# Patient Record
Sex: MALE | HISPANIC OR LATINO
[De-identification: names, ages, dates, MRNs, and addresses within clinical notes are randomized per-mention and may not be internally consistent; named-entity substitution may affect disease eponyms.]

---

## 2020-07-01 PROBLEM — Z00.00 ENCOUNTER FOR PREVENTIVE HEALTH EXAMINATION: Status: ACTIVE | Noted: 2020-07-01

## 2020-07-02 ENCOUNTER — NON-APPOINTMENT (OUTPATIENT)
Age: 65
End: 2020-07-02

## 2020-07-02 ENCOUNTER — APPOINTMENT (OUTPATIENT)
Dept: INTERNAL MEDICINE | Facility: CLINIC | Age: 65
End: 2020-07-02
Payer: MEDICAID

## 2020-07-02 VITALS
OXYGEN SATURATION: 97 % | BODY MASS INDEX: 27.07 KG/M2 | HEIGHT: 69.5 IN | WEIGHT: 187 LBS | HEART RATE: 68 BPM | TEMPERATURE: 97.9 F | DIASTOLIC BLOOD PRESSURE: 90 MMHG | SYSTOLIC BLOOD PRESSURE: 130 MMHG

## 2020-07-02 DIAGNOSIS — L30.9 DERMATITIS, UNSPECIFIED: ICD-10-CM

## 2020-07-02 DIAGNOSIS — M16.11 UNILATERAL PRIMARY OSTEOARTHRITIS, RIGHT HIP: ICD-10-CM

## 2020-07-02 DIAGNOSIS — Z13.1 ENCOUNTER FOR SCREENING FOR DIABETES MELLITUS: ICD-10-CM

## 2020-07-02 DIAGNOSIS — Z12.11 ENCOUNTER FOR SCREENING FOR MALIGNANT NEOPLASM OF COLON: ICD-10-CM

## 2020-07-02 DIAGNOSIS — Z13.220 ENCOUNTER FOR SCREENING FOR LIPOID DISORDERS: ICD-10-CM

## 2020-07-02 DIAGNOSIS — Z12.12 ENCOUNTER FOR SCREENING FOR MALIGNANT NEOPLASM OF COLON: ICD-10-CM

## 2020-07-02 DIAGNOSIS — Z13.0 ENCOUNTER FOR SCREENING FOR DISEASES OF THE BLOOD AND BLOOD-FORMING ORGANS AND CERTAIN DISORDERS INVOLVING THE IMMUNE MECHANISM: ICD-10-CM

## 2020-07-02 DIAGNOSIS — Z83.3 FAMILY HISTORY OF DIABETES MELLITUS: ICD-10-CM

## 2020-07-02 DIAGNOSIS — Z11.59 ENCOUNTER FOR SCREENING FOR OTHER VIRAL DISEASES: ICD-10-CM

## 2020-07-02 DIAGNOSIS — Z11.4 ENCOUNTER FOR SCREENING FOR HUMAN IMMUNODEFICIENCY VIRUS [HIV]: ICD-10-CM

## 2020-07-02 DIAGNOSIS — Z63.4 DISAPPEARANCE AND DEATH OF FAMILY MEMBER: ICD-10-CM

## 2020-07-02 DIAGNOSIS — Z87.891 PERSONAL HISTORY OF NICOTINE DEPENDENCE: ICD-10-CM

## 2020-07-02 PROCEDURE — 90471 IMMUNIZATION ADMIN: CPT

## 2020-07-02 PROCEDURE — 36415 COLL VENOUS BLD VENIPUNCTURE: CPT

## 2020-07-02 PROCEDURE — 99386 PREV VISIT NEW AGE 40-64: CPT | Mod: 25

## 2020-07-02 PROCEDURE — 99214 OFFICE O/P EST MOD 30 MIN: CPT | Mod: 25

## 2020-07-02 PROCEDURE — 90715 TDAP VACCINE 7 YRS/> IM: CPT

## 2020-07-02 PROCEDURE — 93000 ELECTROCARDIOGRAM COMPLETE: CPT | Mod: 59

## 2020-07-02 PROCEDURE — G0442 ANNUAL ALCOHOL SCREEN 15 MIN: CPT

## 2020-07-02 SDOH — SOCIAL STABILITY - SOCIAL INSECURITY: DISSAPEARANCE AND DEATH OF FAMILY MEMBER: Z63.4

## 2020-07-02 NOTE — HEALTH RISK ASSESSMENT
[No falls in past year] : Patient reported no falls in the past year [0] : 2) Feeling down, depressed, or hopeless: Not at all (0) [30 or more] : 30 or more [Never (0 pts)] : Never (0 points) [No] : In the past 12 months have you used drugs other than those required for medical reasons? No [HIV Test offered] : HIV Test offered [Hepatitis C test offered] : Hepatitis C test offered [Fully functional (bathing, dressing, toileting, transferring, walking, feeding)] : Fully functional (bathing, dressing, toileting, transferring, walking, feeding) [Fully functional (using the telephone, shopping, preparing meals, housekeeping, doing laundry, using] : Fully functional and needs no help or supervision to perform IADLs (using the telephone, shopping, preparing meals, housekeeping, doing laundry, using transportation, managing medications and managing finances) [] : No [YearQuit] : 1991 [Audit-CScore] : 0 [[id number]] : 0

## 2020-07-02 NOTE — PHYSICAL EXAM
[Well Nourished] : well nourished [No Acute Distress] : no acute distress [Normal Sclera/Conjunctiva] : normal sclera/conjunctiva [Well Developed] : well developed [Well-Appearing] : well-appearing [PERRL] : pupils equal round and reactive to light [EOMI] : extraocular movements intact [No JVD] : no jugular venous distention [Normal Outer Ear/Nose] : the outer ears and nose were normal in appearance [No Lymphadenopathy] : no lymphadenopathy [Supple] : supple [No Respiratory Distress] : no respiratory distress  [No Accessory Muscle Use] : no accessory muscle use [Thyroid Normal, No Nodules] : the thyroid was normal and there were no nodules present [Normal Percussion] : the chest was normal to percussion [Clear to Auscultation] : lungs were clear to auscultation bilaterally [Normal Rate] : normal rate  [Regular Rhythm] : with a regular rhythm [Normal S1, S2] : normal S1 and S2 [No Carotid Bruits] : no carotid bruits [No Murmur] : no murmur heard [No Abdominal Bruit] : a ~M bruit was not heard ~T in the abdomen [No Varicosities] : no varicosities [No Edema] : there was no peripheral edema [Pedal Pulses Present] : the pedal pulses are present [No Palpable Aorta] : no palpable aorta [Soft] : abdomen soft [Non Tender] : non-tender [No Extremity Clubbing/Cyanosis] : no extremity clubbing/cyanosis [No Masses] : no abdominal mass palpated [Non-distended] : non-distended [Normal Bowel Sounds] : normal bowel sounds [No HSM] : no HSM [No Hernias] : no hernias [Normal Sphincter Tone] : normal sphincter tone [No Mass] : no mass [Normal Supraclavicular Nodes] : no supraclavicular lymphadenopathy [Normal Axillary Nodes] : no axillary lymphadenopathy [Normal Posterior Cervical Nodes] : no posterior cervical lymphadenopathy [Normal Anterior Cervical Nodes] : no anterior cervical lymphadenopathy [Normal Inguinal Nodes] : no inguinal lymphadenopathy [No CVA Tenderness] : no CVA  tenderness [Normal Femoral Nodes] : no femoral lymphadenopathy [No Spinal Tenderness] : no spinal tenderness [No Joint Swelling] : no joint swelling [Grossly Normal Strength/Tone] : grossly normal strength/tone [Normal Gait] : normal gait [No Focal Deficits] : no focal deficits [Coordination Grossly Intact] : coordination grossly intact [Speech Grossly Normal] : speech grossly normal [Memory Grossly Normal] : memory grossly normal [Alert and Oriented x3] : oriented to person, place, and time [Normal Affect] : the affect was normal [Normal Insight/Judgement] : insight and judgment were intact [Normal Mood] : the mood was normal [Stool Occult Blood] : stool negative for occult blood [FreeTextEntry1] : Smooth prostate without nodules. [de-identified] : Excoriated dry slightly red rash left lateral mid leg. [de-identified] : Normal testes and genitalia.  No hernias.

## 2020-07-02 NOTE — REVIEW OF SYSTEMS
[Joint Pain] : joint pain [Negative] : Heme/Lymph [FreeTextEntry9] : Intermittent pain in the right hip from osteoarthritis.

## 2020-07-02 NOTE — HISTORY OF PRESENT ILLNESS
[FreeTextEntry1] : 64-year-old male here for his annual comprehensive evaluation, also complaining of several medical issues. [de-identified] : The patient has not been to a physician in over 10 years, but feels that he has been in generally good health.\par His last encounter for healthcare was when he suffered a traumatic tib-fib fracture in 2004 and had to have a cheng inserted as part of the repair.  He has had no issues with that leg since the repair, but does notice occasionally a rash overlying the left mid leg laterally.  He has had such a rash for the past few weeks.  It is very itchy and he scratches it often.\par The patient also has osteoarthritis of his right hip.  He takes no medications for this, but feels that he could use something at times for the pain.\par The patient is very physically active working around his daughter's home, but never has any cardiovascular symptoms.\par Patient has a distant but extensive smoking history, but denies having any pulmonary symptoms, even with exercise or exertion.\par The patient has never been screened for colorectal cancer or prostate cancer.

## 2020-07-02 NOTE — ASSESSMENT
[FreeTextEntry1] : Plan as above.\par \par The patient will arrange an appoint with a gastroenterologist for colorectal cancer screening.\par \par The patient was advised to call for any problems or questions.\par \par Return visit in September for follow-up and flu vaccine.

## 2020-07-03 ENCOUNTER — NON-APPOINTMENT (OUTPATIENT)
Age: 65
End: 2020-07-03

## 2020-07-06 LAB
ALBUMIN SERPL ELPH-MCNC: 4.7 G/DL
ALP BLD-CCNC: 73 U/L
ALT SERPL-CCNC: 29 U/L
ANION GAP SERPL CALC-SCNC: 14 MMOL/L
AST SERPL-CCNC: 25 U/L
BASOPHILS # BLD AUTO: 0.03 K/UL
BASOPHILS NFR BLD AUTO: 0.4 %
BILIRUB SERPL-MCNC: 1.4 MG/DL
BUN SERPL-MCNC: 14 MG/DL
CALCIUM SERPL-MCNC: 9.4 MG/DL
CHLORIDE SERPL-SCNC: 104 MMOL/L
CHOLEST SERPL-MCNC: 158 MG/DL
CHOLEST/HDLC SERPL: 4.7 RATIO
CO2 SERPL-SCNC: 25 MMOL/L
CREAT SERPL-MCNC: 1 MG/DL
EOSINOPHIL # BLD AUTO: 0.36 K/UL
EOSINOPHIL NFR BLD AUTO: 4.7 %
ESTIMATED AVERAGE GLUCOSE: 117 MG/DL
GLUCOSE SERPL-MCNC: 93 MG/DL
HBA1C MFR BLD HPLC: 5.7 %
HCT VFR BLD CALC: 54.7 %
HCV AB SER QL: NONREACTIVE
HCV S/CO RATIO: 0.12 S/CO
HDLC SERPL-MCNC: 34 MG/DL
HGB BLD-MCNC: 17.2 G/DL
HIV1+2 AB SPEC QL IA.RAPID: NONREACTIVE
IMM GRANULOCYTES NFR BLD AUTO: 0.3 %
LDLC SERPL CALC-MCNC: 94 MG/DL
LYMPHOCYTES # BLD AUTO: 2.38 K/UL
LYMPHOCYTES NFR BLD AUTO: 30.9 %
MAN DIFF?: NORMAL
MCHC RBC-ENTMCNC: 28.2 PG
MCHC RBC-ENTMCNC: 31.4 GM/DL
MCV RBC AUTO: 89.8 FL
MEV IGG FLD QL IA: >300 AU/ML
MEV IGG+IGM SER-IMP: POSITIVE
MONOCYTES # BLD AUTO: 0.92 K/UL
MONOCYTES NFR BLD AUTO: 12 %
MUV AB SER-ACNC: POSITIVE
MUV IGG SER QL IA: 112 AU/ML
NEUTROPHILS # BLD AUTO: 3.98 K/UL
NEUTROPHILS NFR BLD AUTO: 51.7 %
PLATELET # BLD AUTO: 230 K/UL
POTASSIUM SERPL-SCNC: 4.3 MMOL/L
PROT SERPL-MCNC: 7.2 G/DL
RBC # BLD: 6.09 M/UL
RBC # FLD: 14.6 %
RUBV IGG FLD-ACNC: 26.1 INDEX
RUBV IGG SER-IMP: POSITIVE
SARS-COV-2 IGG SERPL IA-ACNC: 0.01 INDEX
SARS-COV-2 IGG SERPL QL IA: NEGATIVE
SODIUM SERPL-SCNC: 143 MMOL/L
TRIGL SERPL-MCNC: 150 MG/DL
VZV AB TITR SER: POSITIVE
VZV IGG SER IF-ACNC: 2261 INDEX
WBC # FLD AUTO: 7.69 K/UL

## 2020-07-07 ENCOUNTER — TRANSCRIPTION ENCOUNTER (OUTPATIENT)
Age: 65
End: 2020-07-07

## 2020-09-25 ENCOUNTER — APPOINTMENT (OUTPATIENT)
Dept: INTERNAL MEDICINE | Facility: CLINIC | Age: 65
End: 2020-09-25
Payer: MEDICAID

## 2020-09-25 VITALS
BODY MASS INDEX: 27.22 KG/M2 | TEMPERATURE: 98.2 F | OXYGEN SATURATION: 96 % | DIASTOLIC BLOOD PRESSURE: 92 MMHG | HEIGHT: 69.5 IN | SYSTOLIC BLOOD PRESSURE: 130 MMHG | WEIGHT: 188 LBS | HEART RATE: 78 BPM

## 2020-09-25 DIAGNOSIS — Z12.12 ENCOUNTER FOR SCREENING FOR MALIGNANT NEOPLASM OF COLON: ICD-10-CM

## 2020-09-25 DIAGNOSIS — R74.8 ABNORMAL LEVELS OF OTHER SERUM ENZYMES: ICD-10-CM

## 2020-09-25 DIAGNOSIS — Z12.11 ENCOUNTER FOR SCREENING FOR MALIGNANT NEOPLASM OF COLON: ICD-10-CM

## 2020-09-25 PROCEDURE — 99214 OFFICE O/P EST MOD 30 MIN: CPT | Mod: 25

## 2020-09-25 PROCEDURE — 90686 IIV4 VACC NO PRSV 0.5 ML IM: CPT

## 2020-09-25 PROCEDURE — G0008: CPT

## 2020-09-25 RX ORDER — ZOSTER VACCINE RECOMBINANT, ADJUVANTED 50 MCG/0.5
50 KIT INTRAMUSCULAR
Qty: 1 | Refills: 1 | Status: DISCONTINUED | COMMUNITY
Start: 2020-07-02 | End: 2020-09-25

## 2020-09-25 NOTE — PHYSICAL EXAM
[Normal Sclera/Conjunctiva] : normal sclera/conjunctiva [Normal Outer Ear/Nose] : the outer ears and nose were normal in appearance [Normal Percussion] : the chest was normal to percussion [Pedal Pulses Present] : the pedal pulses are present [No Edema] : there was no peripheral edema [Normal] : affect was normal and insight and judgment were intact

## 2020-09-25 NOTE — HISTORY OF PRESENT ILLNESS
[Family Member] : family member [FreeTextEntry1] : 65-year-old male here for follow-up of high blood pressure and weight. [de-identified] : The patient has been unable to reduce his weight, mostly because of his inability to exercise.  He would like to walk as exercise, but he lives in the [place] where it is very difficult to get outside and maintain social distancing from people who do not wear masks.\par He has had no cardiovascular symptoms, even with exertion and exercise.\par He has not changed his diet very much, but is trying to reduce his intake of sugar and sweets.\par He has had no gastrointestinal, musculoskeletal or neurologic symptoms.\par He has not done his colonoscopy and reports today that he feels he cannot undergo that test with all it entails.

## 2020-09-30 LAB — HEMOCCULT STL QL IA: NEGATIVE

## 2020-12-21 ENCOUNTER — APPOINTMENT (OUTPATIENT)
Dept: INTERNAL MEDICINE | Facility: CLINIC | Age: 65
End: 2020-12-21
Payer: MEDICAID

## 2020-12-21 VITALS
OXYGEN SATURATION: 96 % | TEMPERATURE: 97.2 F | WEIGHT: 173 LBS | DIASTOLIC BLOOD PRESSURE: 100 MMHG | SYSTOLIC BLOOD PRESSURE: 152 MMHG | BODY MASS INDEX: 25.05 KG/M2 | HEART RATE: 74 BPM | HEIGHT: 69.5 IN

## 2020-12-21 VITALS — SYSTOLIC BLOOD PRESSURE: 142 MMHG | DIASTOLIC BLOOD PRESSURE: 98 MMHG

## 2020-12-21 DIAGNOSIS — Z96.7 PRESENCE OF OTHER BONE AND TENDON IMPLANTS: ICD-10-CM

## 2020-12-21 DIAGNOSIS — E66.3 OVERWEIGHT: ICD-10-CM

## 2020-12-21 DIAGNOSIS — R73.03 PREDIABETES.: ICD-10-CM

## 2020-12-21 PROCEDURE — 99214 OFFICE O/P EST MOD 30 MIN: CPT

## 2020-12-21 PROCEDURE — 99072 ADDL SUPL MATRL&STAF TM PHE: CPT

## 2020-12-21 RX ORDER — TRIAMCINOLONE ACETONIDE 1 MG/G
0.1 CREAM TOPICAL 3 TIMES DAILY
Qty: 30 | Refills: 1 | Status: ACTIVE | COMMUNITY
Start: 2020-07-02 | End: 1900-01-01

## 2020-12-21 NOTE — ASSESSMENT
[FreeTextEntry1] : Plan as above.\par \par The patient and his son were advised to call for any problems or questions.\par \par Return visit for reevaluation of the blood pressure in 6 weeks.

## 2020-12-21 NOTE — PHYSICAL EXAM
[Normal Sclera/Conjunctiva] : normal sclera/conjunctiva [Normal Outer Ear/Nose] : the outer ears and nose were normal in appearance [Normal Percussion] : the chest was normal to percussion [Pedal Pulses Present] : the pedal pulses are present [No Edema] : there was no peripheral edema [Normal Gait] : normal gait [Normal] : affect was normal and insight and judgment were intact [de-identified] : Slightly tender left mid leg medially where screw is palpable.

## 2020-12-21 NOTE — HISTORY OF PRESENT ILLNESS
[Family Member] : family member [FreeTextEntry1] : 65-year-old male here for follow-up of hypertension and other issues. [de-identified] : The patient has had no cardiovascular symptoms, even with exertion or exercise.  He has been exercising regularly, watching his diet, and has successfully reduced his weight significantly.\par The patient complains of some intermittent pain and discomfort in his left leg where a surgical screw was placed for a previous fracture.  He tends to feel this more when there is pressure applied externally to that region, but the symptoms seem to have worsened since his weight reduction.\par The patient has had no gastrointestinal symptoms.  His weight reduction is all intended and he reports feeling much better since losing the weight.

## 2021-03-15 ENCOUNTER — APPOINTMENT (OUTPATIENT)
Dept: INTERNAL MEDICINE | Facility: CLINIC | Age: 66
End: 2021-03-15
Payer: MEDICAID

## 2021-03-15 VITALS
OXYGEN SATURATION: 98 % | BODY MASS INDEX: 24.45 KG/M2 | HEART RATE: 66 BPM | TEMPERATURE: 97.2 F | DIASTOLIC BLOOD PRESSURE: 92 MMHG | SYSTOLIC BLOOD PRESSURE: 142 MMHG | WEIGHT: 168 LBS

## 2021-03-15 DIAGNOSIS — I10 ESSENTIAL (PRIMARY) HYPERTENSION: ICD-10-CM

## 2021-03-15 PROCEDURE — 99072 ADDL SUPL MATRL&STAF TM PHE: CPT

## 2021-03-15 PROCEDURE — 99213 OFFICE O/P EST LOW 20 MIN: CPT

## 2021-03-15 RX ORDER — ENALAPRIL MALEATE 2.5 MG/1
2.5 TABLET ORAL
Qty: 90 | Refills: 1 | Status: DISCONTINUED | COMMUNITY
Start: 2020-12-21 | End: 2021-03-15

## 2021-03-15 RX ORDER — AMLODIPINE BESYLATE 5 MG/1
5 TABLET ORAL DAILY
Qty: 90 | Refills: 0 | Status: ACTIVE | COMMUNITY
Start: 2021-03-15 | End: 1900-01-01

## 2021-03-15 NOTE — HISTORY OF PRESENT ILLNESS
[FreeTextEntry1] : 65-year-old male here for follow-up of hypertension. [de-identified] : The patient has been taking his enalapril every day, although he has not yet taken it today.  He has had no cardiovascular symptoms, even with exertion or exercise.  He is exercising by using a stationary bicycle several times weekly, without any cardiovascular symptoms or limitations.\par Patient does report that he has a slight dry cough that seems to originate in the back of his throat.  He has had this only since taking the enalapril and has no other pulmonary symptoms.

## 2021-03-15 NOTE — ASSESSMENT
[FreeTextEntry1] : Plan as above.\par \par Patient is amenable to taking the Covid vaccine and will try to get an appointment as soon as possible.\par \par The patient was advised to call for any problems or questions.\par \par Return visit in 1 month for follow-up.

## 2021-03-22 ENCOUNTER — TRANSCRIPTION ENCOUNTER (OUTPATIENT)
Age: 66
End: 2021-03-22

## 2021-04-19 ENCOUNTER — TRANSCRIPTION ENCOUNTER (OUTPATIENT)
Age: 66
End: 2021-04-19

## 2022-04-11 PROBLEM — Z11.59 SCREENING FOR VIRAL DISEASE: Status: RESOLVED | Noted: 2020-07-02 | Resolved: 2020-07-09
